# Patient Record
Sex: MALE | Race: WHITE
[De-identification: names, ages, dates, MRNs, and addresses within clinical notes are randomized per-mention and may not be internally consistent; named-entity substitution may affect disease eponyms.]

---

## 2020-08-17 ENCOUNTER — HOSPITAL ENCOUNTER (EMERGENCY)
Dept: HOSPITAL 46 - ED | Age: 60
Discharge: ANOTHER HOSPITAL | End: 2020-08-17
Payer: COMMERCIAL

## 2020-08-17 VITALS — BODY MASS INDEX: 29.82 KG/M2 | HEIGHT: 67 IN | WEIGHT: 189.99 LBS

## 2020-08-17 DIAGNOSIS — I63.9: Primary | ICD-10-CM

## 2020-08-17 DIAGNOSIS — E03.9: ICD-10-CM

## 2020-08-17 DIAGNOSIS — F17.200: ICD-10-CM

## 2020-08-17 DIAGNOSIS — I25.2: ICD-10-CM

## 2020-08-17 DIAGNOSIS — I10: ICD-10-CM

## 2020-08-17 DIAGNOSIS — Z79.899: ICD-10-CM

## 2020-08-17 NOTE — XMS
Encounter Summary
  Created on: 2020
 
 Theo Britton
 External Reference #: [id number]
 : 62
 Sex: Male
 
 Demographics
 
 
+-----------------------+------------------------+
| Address               | [address]         |
|                       | [place]  [address]   |
+-----------------------+------------------------+
| Home Phone            | [phone]        |
+-----------------------+------------------------+
| Preferred Language    | Unknown                |
+-----------------------+------------------------+
| Marital Status        |                |
+-----------------------+------------------------+
| Hindu Affiliation | 1041                   |
+-----------------------+------------------------+
| Race                  | White                  |
+-----------------------+------------------------+
| Ethnic Group          | Not  or  |
+-----------------------+------------------------+
 
 
 Author
 
 
+--------------+--------------------------------------------+
| Author       | [facility]  |
|              | [facility] [place]                                |
+--------------+--------------------------------------------+
| Organization | [facility]  |
|              | [facility] [place]                                |
+--------------+--------------------------------------------+
| Address      | Unknown                                    |
+--------------+--------------------------------------------+
| Phone        | Unavailable                                |
+--------------+--------------------------------------------+
 
 
 
 Support
 
 
+-------------+--------------+---------------------+-----------------+
| Name        | Relationship | Address             | Phone           |
+-------------+--------------+---------------------+-----------------+
| Saqib Britton | ECON         | Unknown             | [phone] |
+-------------+--------------+---------------------+-----------------+
| Saqib Britton | ECON         | [place]  [address] | [phone] |
+-------------+--------------+---------------------+-----------------+
 
 
 
 
 Care Team Providers
 
 
+-----------------------+------+-------------+
| Care Team Member Name | Role | Phone       |
+-----------------------+------+-------------+
 PCP  | Unavailable |
+-----------------------+------+-------------+
 
 
 
 Encounter Details
 
 
+--------+-----------+----------------------+----------------------+----------------------+
| Date   | Type      | Department           | Care Team            | Description          |
+--------+-----------+----------------------+----------------------+----------------------+
| / | Hospital  |   [facility]     |   [place],            | AMI NOS, unspecified |
|  - | Encounter | CONVERSION DEP  [address]  | Shad Leal,   |  ([facility])               |
|        |           | [address]           | MD  [address]   |                      |
| / |           | [place], [place]         | [place], [place]         |                      |
|    |           | [address]           | [address]           |                      |
|        |           | [phone]         | [phone]         |                      |
|        |           |                      | [phone] (Fax)   |                      |
+--------+-----------+----------------------+----------------------+----------------------+
 
 
 
 Social History
 
 
+----------------+-------+-----------+--------+------+
| Tobacco Use    | Types | Packs/Day | Years  | Date |
|                |       |           | Used   |      |
+----------------+-------+-----------+--------+------+
| Never Assessed |       |           |        |      |
+----------------+-------+-----------+--------+------+
 
 
 
+------------------+---------------+
| Sex Assigned at  | Date Recorded |
| Birth            |               |
+------------------+---------------+
| Not on file      |               |
+------------------+---------------+
 documented as of this encounter
 
 Plan of Treatment
 Not on filedocumented as of this encounter
 
 Procedures
 
 
+----------------------+--------+-------------+----------------------+----------------------
+
| Procedure Name       | Priori | Date/Time   | Associated Diagnosis | Comments             
|
|                      | ty     |             |                      |                      
 
|
+----------------------+--------+-------------+----------------------+----------------------
+
| CV CARDIAC PROCEDURE | Routin | 2011  |                      |   Results for this   
|
|                      | e      |  4:45 PM    |                      | procedure are in the 
|
|                      |        | PST         |                      |  results section.    
|
+----------------------+--------+-------------+----------------------+----------------------
+
| CV CARDIAC PROCEDURE | Routin | 2011  |                      |   Results for this   
|
|                      | e      |  4:45 PM    |                      | procedure are in the 
|
|                      |        | PST         |                      |  results section.    
|
+----------------------+--------+-------------+----------------------+----------------------
+
 documented in this encounter
 
 Results
 CV CARDIAC PROCEDURE (2011  4:45 PM PST)
 
+----------+
| Specimen |
+----------+
|          |
+----------+
 
 
 
+------------------------------------------------------------------------+--------------+
| Narrative                                                              | Performed At |
+------------------------------------------------------------------------+--------------+
|   PROCEDURES  1. Left heart catheterization and coronary angiography.  |              |
|  2. Intercoronary stent implantation into the right coronary artery.   |              |
|    OPERATIVE NOTE  The patient came in as an acute inferior myocardial |              |
|  infarction.     RESULTS  Aortic pressure 98/73, with a mean of 86.    |              |
| Left ventricular pressure is 106/22, with an EDP of 38.                |              |
| ARTERIOGRAPHY  1. The left main coronary artery is normal.  2. The     |              |
| left anterior descending has a proximal 30% to 40% smooth              |              |
| narrowing.  3. The left circumflex is a small nondominant artery. It   |              |
| is      angiographically unremarkable.  4. The right coronary artery   |              |
| is a huge dominant artery that is totally      occluded proximally.    |              |
|   LEFT VENTRICULOGRAM  The left ventriculogram done after intervention |              |
|  showed inferobasal  hypokinesis to akinesis with overall ejection     |              |
| fraction of 50%.     DESCRIPTION OF PROCEDURE  I elected to intervene  |              |
| upon the right coronary artery. A 6-Kittitian  David right guide       |              |
| catheter was placed in the right coronary os, and  an 0.14 -50    |              |
| guidewire was advanced across the total occlusion with  minimal        |              |
| resistance. A 3 x 20 mm Diablo angioplasty balloon was advanced to  the  |              |
| total occlusion, inflated up to 8 atmospheres for 20 seconds,          |              |
| deflated and then smoothly withdrawn. This did open up the artery      |              |
| nicely. A 4 x 18 mm Vision stent catheter was then placed with cine    |              |
| assistance and inflated up to 11 atmospheres for 15 seconds, deflated  |              |
|  and then reinflated to 10 atmospheres for 10 seconds. Hand injections |              |
|   showed excellent results with 0% residual stenosis. There was minor  |              |
|  nonobstructive disease diffusely down the vessel. Pharmacologically,  |              |
| he  came in on a heparin drip, and he was give an additional 2000      |              |
 
| units of  intervascular heparin. An ACT was 246 seconds prior to the   |              |
| beginning of  the interventional portion of the case. He was given a   |              |
| double bolus and  drip of Integrilin and orally loaded with Plavix at  |              |
| the termination of  the case.     ESTIMATED BLOOD LOSS  Less than 50   |              |
| mL.        DD:   2011 04:52 P  DT:   2011 05:30 P          |              |
| CKR/bc1/[id number]/     Read by  SHAD LEHMAN MD 2011     |              |
| 04:52 P     Electronically signed by Shad Lehman MD on       |              |
| 2012 3:10 PM                                                      |              |
+------------------------------------------------------------------------+--------------+
 
 
 
+--------------------------------------------------------------------------+
| Procedure Note                                                           |
+--------------------------------------------------------------------------+
|   Luke, Rad Conversion - 2019  5:16 PM PDT  PROCEDURES              |
| 1. Left heart catheterization and coronary angiography.                  |
| 2. Intercoronary stent implantation into the right coronary artery.      |
|                                                                          |
| OPERATIVE NOTE                                                           |
| The patient came in as an acute inferior myocardial infarction.          |
|                                                                          |
| RESULTS                                                                  |
| Aortic pressure 98/73, with a mean of 86.                                |
| Left ventricular pressure is 106/22, with an EDP of 38.                  |
|                                                                          |
| ARTERIOGRAPHY                                                            |
| 1. The left main coronary artery is normal.                              |
| 2. The left anterior descending has a proximal 30% to 40% smooth         |
|    narrowing.                                                            |
| 3. The left circumflex is a small nondominant artery. It is              |
|    angiographically unremarkable.                                        |
| 4. The right coronary artery is a huge dominant artery that is totally   |
|    occluded proximally.                                                  |
|                                                                          |
| LEFT VENTRICULOGRAM                                                      |
| The left ventriculogram done after intervention showed inferobasal       |
| hypokinesis to akinesis with overall ejection fraction of 50%.           |
|                                                                          |
| DESCRIPTION OF PROCEDURE                                                 |
| I elected to intervene upon the right coronary artery. A 6-Kittitian        |
| David right guide catheter was placed in the right coronary os, and   |
| an 0.14 -50 guidewire was advanced across the total occlusion with  |
| minimal resistance. A 3 x 20 mm Diablo angioplasty balloon was advanced to |
| the total occlusion, inflated up to 8 atmospheres for 20 seconds,        |
| deflated and then smoothly withdrawn. This did open up the artery        |
| nicely. A 4 x 18 mm Vision stent catheter was then placed with cine      |
| assistance and inflated up to 11 atmospheres for 15 seconds, deflated    |
| and then reinflated to 10 atmospheres for 10 seconds. Hand injections    |
| showed excellent results with 0% residual stenosis. There was minor      |
| nonobstructive disease diffusely down the vessel. Pharmacologically, he  |
| came in on a heparin drip, and he was give an additional 2000 units of   |
| intervascular heparin. An ACT was 246 seconds prior to the beginning of  |
| the interventional portion of the case. He was given a double bolus and  |
| drip of Integrilin and orally loaded with Plavix at the termination of   |
| the case.                                                                |
|                                                                          |
| ESTIMATED BLOOD LOSS                                                     |
| Less than 50 mL.                                                         |
|                                                                          |
 
|                                                                          |
| DD:  2011 04:52 P                                                  |
| DT:  2011 05:30 P                                                  |
| [facility]/bc1/[id number]/                                                        |
|                                                                          |
| Read by                                                                  |
| SHAD LEHMAN MD 2011 04:52 P                              |
|                                                                          |
| Electronically signed by Shad Lehman MD on 2012 3:10 PM   |
+--------------------------------------------------------------------------+
 CV CARDIAC PROCEDURE (2011  4:45 PM PST)
 
+----------+
| Specimen |
+----------+
|          |
+----------+
 
 
 
+------------------------------------------------------------------------+--------------+
| Narrative                                                              | Performed At |
+------------------------------------------------------------------------+--------------+
|   PROCEDURES  1. Left heart catheterization and coronary angiography.  |              |
|  2. Intercoronary stent implantation into the right coronary artery.   |              |
|    OPERATIVE NOTE  The patient came in as an acute inferior myocardial |              |
|  infarction.     RESULTS  Aortic pressure 98/73, with a mean of 86.    |              |
| Left ventricular pressure is 106/22, with an EDP of 38.                |              |
| ARTERIOGRAPHY  1. The left main coronary artery is normal.  2. The     |              |
| left anterior descending has a proximal 30% to 40% smooth              |              |
| narrowing.  3. The left circumflex is a small nondominant artery. It   |              |
| is      angiographically unremarkable.  4. The right coronary artery   |              |
| is a huge dominant artery that is totally      occluded proximally.    |              |
|   LEFT VENTRICULOGRAM  The left ventriculogram done after intervention |              |
|  showed inferobasal  hypokinesis to akinesis with overall ejection     |              |
| fraction of 50%.     DESCRIPTION OF PROCEDURE  I elected to intervene  |              |
| upon the right coronary artery. A 6-Kittitian  David right guide       |              |
| catheter was placed in the right coronary os, and  an 0.14 -50    |              |
| guidewire was advanced across the total occlusion with  minimal        |              |
| resistance. A 3 x 20 mm Diablo angioplasty balloon was advanced to  the  |              |
| total occlusion, inflated up to 8 atmospheres for 20 seconds,          |              |
| deflated and then smoothly withdrawn. This did open up the artery      |              |
| nicely. A 4 x 18 mm Vision stent catheter was then placed with cine    |              |
| assistance and inflated up to 11 atmospheres for 15 seconds, deflated  |              |
|  and then reinflated to 10 atmospheres for 10 seconds. Hand injections |              |
|   showed excellent results with 0% residual stenosis. There was minor  |              |
|  nonobstructive disease diffusely down the vessel. Pharmacologically,  |              |
| he  came in on a heparin drip, and he was give an additional 2000      |              |
| units of  intervascular heparin. An ACT was 246 seconds prior to the   |              |
| beginning of  the interventional portion of the case. He was given a   |              |
| double bolus and  drip of Integrilin and orally loaded with Plavix at  |              |
| the termination of  the case.     ESTIMATED BLOOD LOSS  Less than 50   |              |
| mL.        DD:   2011 04:52 P  DT:   2011 05:30 P          |              |
| CKR/bc1/[id number]/     Read by  SHAD LEHMAN MD 2011     |              |
| 04:52 P     Electronically signed by Shad Lehman MD on       |              |
| 2012 3:10 PM                                                      |              |
+------------------------------------------------------------------------+--------------+
 
 
 
 
+--------------------------------------------------------------------------+
| Procedure Note                                                           |
+--------------------------------------------------------------------------+
|   Luke, Rad Conversion - 2019  5:16 PM PDT  PROCEDURES              |
| 1. Left heart catheterization and coronary angiography.                  |
| 2. Intercoronary stent implantation into the right coronary artery.      |
|                                                                          |
| OPERATIVE NOTE                                                           |
| The patient came in as an acute inferior myocardial infarction.          |
|                                                                          |
| RESULTS                                                                  |
| Aortic pressure 98/73, with a mean of 86.                                |
| Left ventricular pressure is 106/22, with an EDP of 38.                  |
|                                                                          |
| ARTERIOGRAPHY                                                            |
| 1. The left main coronary artery is normal.                              |
| 2. The left anterior descending has a proximal 30% to 40% smooth         |
|    narrowing.                                                            |
| 3. The left circumflex is a small nondominant artery. It is              |
|    angiographically unremarkable.                                        |
| 4. The right coronary artery is a huge dominant artery that is totally   |
|    occluded proximally.                                                  |
|                                                                          |
| LEFT VENTRICULOGRAM                                                      |
| The left ventriculogram done after intervention showed inferobasal       |
| hypokinesis to akinesis with overall ejection fraction of 50%.           |
|                                                                          |
| DESCRIPTION OF PROCEDURE                                                 |
| I elected to intervene upon the right coronary artery. A 6-Kittitian        |
| David right guide catheter was placed in the right coronary os, and   |
| an 0.14 -50 guidewire was advanced across the total occlusion with  |
| minimal resistance. A 3 x 20 mm Diablo angioplasty balloon was advanced to |
| the total occlusion, inflated up to 8 atmospheres for 20 seconds,        |
| deflated and then smoothly withdrawn. This did open up the artery        |
| nicely. A 4 x 18 mm Vision stent catheter was then placed with cine      |
| assistance and inflated up to 11 atmospheres for 15 seconds, deflated    |
| and then reinflated to 10 atmospheres for 10 seconds. Hand injections    |
| showed excellent results with 0% residual stenosis. There was minor      |
| nonobstructive disease diffusely down the vessel. Pharmacologically, he  |
| came in on a heparin drip, and he was give an additional 2000 units of   |
| intervascular heparin. An ACT was 246 seconds prior to the beginning of  |
| the interventional portion of the case. He was given a double bolus and  |
| drip of Integrilin and orally loaded with Plavix at the termination of   |
| the case.                                                                |
|                                                                          |
| ESTIMATED BLOOD LOSS                                                     |
| Less than 50 mL.                                                         |
|                                                                          |
|                                                                          |
| DD:  2011 04:52 P                                                  |
| DT:  2011 05:30 P                                                  |
| CKR/bc1/[id number]/                                                        |
|                                                                          |
| Read by                                                                  |
| SHAD LEHMAN MD 2011 04:52 P                              |
|                                                                          |
| Electronically signed by Shad Lehman MD on 2012 3:10 PM   |
+--------------------------------------------------------------------------+
 documented in this encounter
 
 
 Visit Diagnoses
 
 
+------------------------------------------------------------------------------+
| Diagnosis                                                                    |
+------------------------------------------------------------------------------+
|   Acute myocardial infarction, unspecified site, episode of care unspecified |
+------------------------------------------------------------------------------+
 documented in this encounter

## 2020-08-17 NOTE — EKG
[facility]
                                    [address]
                                  [place], [place]  [address]
_________________________________________________________________________________________
                                                                 Signed   
 
 
Sinus rhythm with short NE
Inferior-posterior infarct , age undetermined
Abnormal ECG
No previous ECGs available
Confirmed by MI SÁNCHEZ DO ([id number]) on 8/17/2020 4:39:34 PM
 
 
 
 
 
 
 
 
 
 
 
 
 
 
 
 
 
 
 
 
 
 
 
 
 
 
 
 
 
 
 
 
 
 
 
 
 
 
 
 
    Electronically Signed By: MI SÁNCHEZ DO  08/17/20 1639
_________________________________________________________________________________________
PATIENT NAME:     JASON DÍAZ                     
MEDICAL RECORD #: [id number]                     Electrocardiogram             
          ACCT #: [id number]  
DATE OF BIRTH:   [birth year]                                       
PHYSICIAN:   MI SÁNCHEZ DO                     REPORT #: [id number]
REPORT IS CONFIDENTIAL AND NOT TO BE RELEASED WITHOUT AUTHORIZATION

## 2020-08-17 NOTE — XMS
Clinical Summary
  Created on: 2020
 
 Theo Britton
 External Reference #: [id number]
 : 62
 Sex: Male
 
 Demographics
 
 
+-----------------------+------------------------+
| Address               | [address]         |
|                       | [place]  [address]   |
+-----------------------+------------------------+
| Home Phone            | [phone]        |
+-----------------------+------------------------+
| Preferred Language    | Unknown                |
+-----------------------+------------------------+
| Marital Status        |                |
+-----------------------+------------------------+
| Confucianist Affiliation | [id number]                   |
+-----------------------+------------------------+
| Race                  | White                  |
+-----------------------+------------------------+
| Ethnic Group          | Not  or  |
+-----------------------+------------------------+
 
 
 Author
 
 
+--------------+--------------------------------------------+
| Author       | [facility]  |
|              | [facility] [place]                                |
+--------------+--------------------------------------------+
| Organization | [facility]  |
|              | [facility] [place]                                |
+--------------+--------------------------------------------+
| Address      | Unknown                                    |
+--------------+--------------------------------------------+
| Phone        | Unavailable                                |
+--------------+--------------------------------------------+
 
 
 
 Support
 
 
+-------------+--------------+---------------------+-----------------+
| Name        | Relationship | Address             | Phone           |
+-------------+--------------+---------------------+-----------------+
| Saqib Britton | ECON         | Unknown             | [phone] |
+-------------+--------------+---------------------+-----------------+
| Saqib Britton | ECON         | [place]  [address] | [phone] |
+-------------+--------------+---------------------+-----------------+
 
 
 
 
 Care Team Providers
 
 
+-----------------------+------+-----------------+
| Care Team Member Name | Role | Phone           |
+-----------------------+------+-----------------+
| Nerissa Cullen PA-C   | PCP  | [phone] |
+-----------------------+------+-----------------+
 
 
 
 Allergies
 Not on File
 
 Medications
 Not on file
 
 Active Problems
 Not on file
 
 Social History
 
 
+----------------+-------+-----------+--------+------+
| Tobacco Use    | Types | Packs/Day | Years  | Date |
|                |       |           | Used   |      |
+----------------+-------+-----------+--------+------+
| Never Assessed |       |           |        |      |
+----------------+-------+-----------+--------+------+
 
 
 
+------------------+---------------+
| Sex Assigned at  | Date Recorded |
| Birth            |               |
+------------------+---------------+
| Not on file      |               |
+------------------+---------------+
 
 
 
 Last Filed Vital Signs
 Not on file
 
 Plan of Treatment
 
 
+---------------------+-----------+-------+----------+
| Health Maintenance  | Due Date  | Last  | Comments |
|                     |           | Done  |          |
+---------------------+-----------+-------+----------+
| Vaccine:            |  |       |          |
| Dtap/Tdap/Td (1 -   | 0         |       |          |
| Tdap)               |           |       |          |
+---------------------+-----------+-------+----------+
| Vaccine: Zoster (1  |  |       |          |
| of 2)               | 1         |       |          |
+---------------------+-----------+-------+----------+
| Vaccine: Influenza  |  |       |          |
 
| (#1)                | 0         |       |          |
+---------------------+-----------+-------+----------+
 
 
 
 Results
 Not on filefrom Last 3 Months